# Patient Record
Sex: FEMALE | Race: WHITE | NOT HISPANIC OR LATINO | Employment: FULL TIME | URBAN - METROPOLITAN AREA
[De-identification: names, ages, dates, MRNs, and addresses within clinical notes are randomized per-mention and may not be internally consistent; named-entity substitution may affect disease eponyms.]

---

## 2017-06-26 ENCOUNTER — HOSPITAL ENCOUNTER (EMERGENCY)
Facility: HOSPITAL | Age: 27
Discharge: HOME | End: 2017-06-26
Attending: EMERGENCY MEDICINE

## 2017-06-26 VITALS
HEIGHT: 67 IN | BODY MASS INDEX: 22.76 KG/M2 | HEART RATE: 100 BPM | OXYGEN SATURATION: 100 % | TEMPERATURE: 98 F | SYSTOLIC BLOOD PRESSURE: 128 MMHG | WEIGHT: 145 LBS | DIASTOLIC BLOOD PRESSURE: 66 MMHG | RESPIRATION RATE: 20 BRPM

## 2017-06-26 DIAGNOSIS — S90.31XA CONTUSION OF RIGHT FOOT, INITIAL ENCOUNTER: ICD-10-CM

## 2017-06-26 DIAGNOSIS — M79.673 FOOT PAIN: ICD-10-CM

## 2017-06-26 DIAGNOSIS — M79.671 RIGHT FOOT PAIN: Primary | ICD-10-CM

## 2017-06-26 PROCEDURE — 99283 EMERGENCY DEPT VISIT LOW MDM: CPT

## 2017-06-26 NOTE — ED PROVIDER NOTES
Encounter Date: 6/26/2017       History     Chief Complaint   Patient presents with    Foot Injury     horse stepped on foot las t week      Farida Sanchez is a 26 year old female with no pmh presenting to the ED with c/o a one week history of right foot pain. The patient states that a horse stepped on her foot one week ago. She has been able to bear weight with pain and has also had bruising to the foot.           Review of patient's allergies indicates:  No Known Allergies  History reviewed. No pertinent past medical history.  History reviewed. No pertinent surgical history.  History reviewed. No pertinent family history.  Social History   Substance Use Topics    Smoking status: Current Every Day Smoker    Smokeless tobacco: Not on file    Alcohol use Not on file     Review of Systems   Constitutional: Negative.    HENT: Negative.    Respiratory: Negative.    Cardiovascular: Negative.    Gastrointestinal: Negative.    Genitourinary: Negative.    Musculoskeletal: Positive for arthralgias (right foot).   Skin: Positive for color change (bruising to top of right foot).   Neurological: Negative.        Physical Exam     Initial Vitals [06/26/17 1629]   BP Pulse Resp Temp SpO2   128/66 100 20 98.4 °F (36.9 °C) 100 %      MAP       86.67         Physical Exam    Nursing note and vitals reviewed.  Constitutional: She appears well-developed and well-nourished. She is not diaphoretic. No distress.   HENT:   Head: Normocephalic and atraumatic.   Eyes: Conjunctivae are normal.   Neck: Normal range of motion.   Cardiovascular: Normal rate and regular rhythm.   Pulmonary/Chest: Breath sounds normal. No respiratory distress.   Musculoskeletal: Normal range of motion.        Right ankle: Normal. She exhibits normal range of motion. No tenderness.        Right foot: There is tenderness. There is normal range of motion and no deformity.   Bruising to dorsum of foot from 2-5 MTP joints. Tenderness to palpation.     Neurological: She is alert and oriented to person, place, and time.   Skin: Skin is warm and dry. Capillary refill takes less than 2 seconds.   Psychiatric: She has a normal mood and affect.         ED Course   Procedures  Labs Reviewed - No data to display                APC / Resident Notes:   Farida Sanchez is a 26 year old female presenting to the ED with c/o right foot pain. The patient is bearing weight in the ED without difficulty. There is some bruising over the dorsum of the foot with no evidence of fracture or dislocation noted on xray. I offered to provide an ace wrap and crutches but the patient refused and stated that she had these at home if needed. She requested a note to return to work and was provided this at time of discharge. I discussed specific return precautions and she verbalized understanding. Based on my clinical evaluation, I do not appreciate any immediate, emergent, or life threatening condition or etiology that warrants additional workup today and feel that the patient can be discharged with close follow up care.                 ED Course     Clinical Impression:   The primary encounter diagnosis was Right foot pain. Diagnoses of Foot pain and Contusion of right foot, initial encounter were also pertinent to this visit.    Disposition:   Disposition: Discharged  Condition: Stable                        Mary Olvera NP  06/26/17 7782